# Patient Record
Sex: FEMALE | Race: OTHER | HISPANIC OR LATINO | Employment: UNEMPLOYED | URBAN - METROPOLITAN AREA
[De-identification: names, ages, dates, MRNs, and addresses within clinical notes are randomized per-mention and may not be internally consistent; named-entity substitution may affect disease eponyms.]

---

## 2022-01-20 ENCOUNTER — SOCIAL WORK (OUTPATIENT)
Dept: ADMINISTRATIVE | Facility: OTHER | Age: 27
End: 2022-01-20

## 2022-01-20 PROBLEM — Z28.39 RUBELLA NON-IMMUNE STATUS, ANTEPARTUM: Status: ACTIVE | Noted: 2022-01-20

## 2022-01-20 PROBLEM — O09.899 RUBELLA NON-IMMUNE STATUS, ANTEPARTUM: Status: ACTIVE | Noted: 2022-01-20

## 2022-01-20 NOTE — PROGRESS NOTES
SW completed pt's notification of pregnancy and faxed/scanned into epic. No other needs identified at this time.    Gregoria Wilson,MSW  Pager#[phone]

## 2022-02-07 PROBLEM — Z78.9 USES SPANISH AS PRIMARY SPOKEN LANGUAGE: Status: ACTIVE | Noted: 2022-02-07

## 2022-02-07 PROBLEM — N89.8 VAGINAL IRRITATION: Status: ACTIVE | Noted: 2022-02-07

## 2022-02-07 PROBLEM — Z3A.19 19 WEEKS GESTATION OF PREGNANCY: Status: ACTIVE | Noted: 2022-02-07

## 2022-02-07 PROBLEM — Z34.92 ENCOUNTER FOR SUPERVISION OF LOW-RISK PREGNANCY IN SECOND TRIMESTER: Status: ACTIVE | Noted: 2022-02-07

## 2022-02-28 PROBLEM — Z3A.22 22 WEEKS GESTATION OF PREGNANCY: Status: ACTIVE | Noted: 2022-02-07

## 2022-02-28 PROBLEM — N89.8 VAGINAL IRRITATION: Status: RESOLVED | Noted: 2022-02-07 | Resolved: 2022-02-28

## 2022-03-18 ENCOUNTER — PATIENT MESSAGE (OUTPATIENT)
Dept: ADMINISTRATIVE | Facility: OTHER | Age: 27
End: 2022-03-18

## 2022-03-21 PROBLEM — Z3A.25 25 WEEKS GESTATION OF PREGNANCY: Status: ACTIVE | Noted: 2022-02-07

## 2022-06-06 PROBLEM — Z3A.36 36 WEEKS GESTATION OF PREGNANCY: Status: ACTIVE | Noted: 2022-02-07

## 2022-06-06 PROBLEM — Z34.93 ENCOUNTER FOR SUPERVISION OF LOW-RISK PREGNANCY IN THIRD TRIMESTER: Status: ACTIVE | Noted: 2022-02-07

## 2022-06-08 PROBLEM — B95.1 POSITIVE GBS TEST: Status: ACTIVE | Noted: 2022-06-08

## 2022-06-22 PROBLEM — Z3A.38 38 WEEKS GESTATION OF PREGNANCY: Status: ACTIVE | Noted: 2022-02-07

## 2022-06-29 PROBLEM — Z3A.39 39 WEEKS GESTATION OF PREGNANCY: Status: ACTIVE | Noted: 2022-02-07

## 2023-06-05 PROBLEM — Z3A.39 39 WEEKS GESTATION OF PREGNANCY: Status: RESOLVED | Noted: 2022-02-07 | Resolved: 2023-06-05
